# Patient Record
Sex: MALE | Race: WHITE | NOT HISPANIC OR LATINO | URBAN - METROPOLITAN AREA
[De-identification: names, ages, dates, MRNs, and addresses within clinical notes are randomized per-mention and may not be internally consistent; named-entity substitution may affect disease eponyms.]

---
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
## 2020-11-17 ENCOUNTER — INPATIENT (INPATIENT)
Facility: HOSPITAL | Age: 20
LOS: 2 days | Discharge: HOME | End: 2020-11-20
Attending: PSYCHIATRY & NEUROLOGY | Admitting: PSYCHIATRY & NEUROLOGY
Payer: COMMERCIAL

## 2020-11-17 VITALS
OXYGEN SATURATION: 99 % | SYSTOLIC BLOOD PRESSURE: 147 MMHG | HEIGHT: 71 IN | RESPIRATION RATE: 18 BRPM | HEART RATE: 99 BPM | TEMPERATURE: 98 F | DIASTOLIC BLOOD PRESSURE: 89 MMHG

## 2020-11-17 DIAGNOSIS — F33.2 MAJOR DEPRESSIVE DISORDER, RECURRENT SEVERE WITHOUT PSYCHOTIC FEATURES: ICD-10-CM

## 2020-11-17 DIAGNOSIS — F17.201 NICOTINE DEPENDENCE, UNSPECIFIED, IN REMISSION: ICD-10-CM

## 2020-11-17 LAB
ALBUMIN SERPL ELPH-MCNC: 4.8 G/DL — SIGNIFICANT CHANGE UP (ref 3.3–5)
ALP SERPL-CCNC: 85 U/L — SIGNIFICANT CHANGE UP (ref 60–270)
ALT FLD-CCNC: 50 U/L — HIGH (ref 4–41)
AMPHET UR-MCNC: NEGATIVE — SIGNIFICANT CHANGE UP
ANION GAP SERPL CALC-SCNC: 12 MMO/L — SIGNIFICANT CHANGE UP (ref 7–14)
APAP SERPL-MCNC: < 15 UG/ML — LOW (ref 15–25)
APPEARANCE UR: CLEAR — SIGNIFICANT CHANGE UP
AST SERPL-CCNC: 25 U/L — SIGNIFICANT CHANGE UP (ref 4–40)
B PERT DNA SPEC QL NAA+PROBE: SIGNIFICANT CHANGE UP
BARBITURATES UR SCN-MCNC: NEGATIVE — SIGNIFICANT CHANGE UP
BASOPHILS # BLD AUTO: 0.04 K/UL — SIGNIFICANT CHANGE UP (ref 0–0.2)
BASOPHILS NFR BLD AUTO: 0.5 % — SIGNIFICANT CHANGE UP (ref 0–2)
BENZODIAZ UR-MCNC: NEGATIVE — SIGNIFICANT CHANGE UP
BILIRUB SERPL-MCNC: 0.6 MG/DL — SIGNIFICANT CHANGE UP (ref 0.2–1.2)
BILIRUB UR-MCNC: NEGATIVE — SIGNIFICANT CHANGE UP
BLOOD UR QL VISUAL: NEGATIVE — SIGNIFICANT CHANGE UP
BUN SERPL-MCNC: 12 MG/DL — SIGNIFICANT CHANGE UP (ref 7–23)
C PNEUM DNA SPEC QL NAA+PROBE: SIGNIFICANT CHANGE UP
CALCIUM SERPL-MCNC: 10 MG/DL — SIGNIFICANT CHANGE UP (ref 8.4–10.5)
CANNABINOIDS UR-MCNC: NEGATIVE — SIGNIFICANT CHANGE UP
CHLORIDE SERPL-SCNC: 101 MMOL/L — SIGNIFICANT CHANGE UP (ref 98–107)
CO2 SERPL-SCNC: 25 MMOL/L — SIGNIFICANT CHANGE UP (ref 22–31)
COCAINE METAB.OTHER UR-MCNC: NEGATIVE — SIGNIFICANT CHANGE UP
COLOR SPEC: SIGNIFICANT CHANGE UP
CREAT SERPL-MCNC: 0.81 MG/DL — SIGNIFICANT CHANGE UP (ref 0.5–1.3)
EOSINOPHIL # BLD AUTO: 0.29 K/UL — SIGNIFICANT CHANGE UP (ref 0–0.5)
EOSINOPHIL NFR BLD AUTO: 3.9 % — SIGNIFICANT CHANGE UP (ref 0–6)
ETHANOL BLD-MCNC: < 10 MG/DL — SIGNIFICANT CHANGE UP
FLUAV H1 2009 PAND RNA SPEC QL NAA+PROBE: SIGNIFICANT CHANGE UP
FLUAV H1 RNA SPEC QL NAA+PROBE: SIGNIFICANT CHANGE UP
FLUAV H3 RNA SPEC QL NAA+PROBE: SIGNIFICANT CHANGE UP
FLUAV SUBTYP SPEC NAA+PROBE: SIGNIFICANT CHANGE UP
FLUBV RNA SPEC QL NAA+PROBE: SIGNIFICANT CHANGE UP
GLUCOSE SERPL-MCNC: 92 MG/DL — SIGNIFICANT CHANGE UP (ref 70–99)
GLUCOSE UR-MCNC: NEGATIVE — SIGNIFICANT CHANGE UP
HADV DNA SPEC QL NAA+PROBE: SIGNIFICANT CHANGE UP
HCOV PNL SPEC NAA+PROBE: SIGNIFICANT CHANGE UP
HCT VFR BLD CALC: 49.1 % — SIGNIFICANT CHANGE UP (ref 39–50)
HGB BLD-MCNC: 17.3 G/DL — HIGH (ref 13–17)
HMPV RNA SPEC QL NAA+PROBE: SIGNIFICANT CHANGE UP
HPIV1 RNA SPEC QL NAA+PROBE: SIGNIFICANT CHANGE UP
HPIV2 RNA SPEC QL NAA+PROBE: SIGNIFICANT CHANGE UP
HPIV3 RNA SPEC QL NAA+PROBE: SIGNIFICANT CHANGE UP
HPIV4 RNA SPEC QL NAA+PROBE: SIGNIFICANT CHANGE UP
IMM GRANULOCYTES NFR BLD AUTO: 0.3 % — SIGNIFICANT CHANGE UP (ref 0–1.5)
KETONES UR-MCNC: NEGATIVE — SIGNIFICANT CHANGE UP
LEUKOCYTE ESTERASE UR-ACNC: NEGATIVE — SIGNIFICANT CHANGE UP
LYMPHOCYTES # BLD AUTO: 1.74 K/UL — SIGNIFICANT CHANGE UP (ref 1–3.3)
LYMPHOCYTES # BLD AUTO: 23.3 % — SIGNIFICANT CHANGE UP (ref 13–44)
MCHC RBC-ENTMCNC: 29.1 PG — SIGNIFICANT CHANGE UP (ref 27–34)
MCHC RBC-ENTMCNC: 35.2 % — SIGNIFICANT CHANGE UP (ref 32–36)
MCV RBC AUTO: 82.7 FL — SIGNIFICANT CHANGE UP (ref 80–100)
METHADONE UR-MCNC: NEGATIVE — SIGNIFICANT CHANGE UP
MONOCYTES # BLD AUTO: 0.67 K/UL — SIGNIFICANT CHANGE UP (ref 0–0.9)
MONOCYTES NFR BLD AUTO: 9 % — SIGNIFICANT CHANGE UP (ref 2–14)
NEUTROPHILS # BLD AUTO: 4.71 K/UL — SIGNIFICANT CHANGE UP (ref 1.8–7.4)
NEUTROPHILS NFR BLD AUTO: 63 % — SIGNIFICANT CHANGE UP (ref 43–77)
NITRITE UR-MCNC: NEGATIVE — SIGNIFICANT CHANGE UP
NRBC # FLD: 0 K/UL — SIGNIFICANT CHANGE UP (ref 0–0)
OPIATES UR-MCNC: NEGATIVE — SIGNIFICANT CHANGE UP
OXYCODONE UR-MCNC: NEGATIVE — SIGNIFICANT CHANGE UP
PCP UR-MCNC: NEGATIVE — SIGNIFICANT CHANGE UP
PH UR: 7 — SIGNIFICANT CHANGE UP (ref 5–8)
PLATELET # BLD AUTO: 284 K/UL — SIGNIFICANT CHANGE UP (ref 150–400)
PMV BLD: 9.6 FL — SIGNIFICANT CHANGE UP (ref 7–13)
POTASSIUM SERPL-MCNC: 3.7 MMOL/L — SIGNIFICANT CHANGE UP (ref 3.5–5.3)
POTASSIUM SERPL-SCNC: 3.7 MMOL/L — SIGNIFICANT CHANGE UP (ref 3.5–5.3)
PROT SERPL-MCNC: 7.7 G/DL — SIGNIFICANT CHANGE UP (ref 6–8.3)
PROT UR-MCNC: NEGATIVE — SIGNIFICANT CHANGE UP
RAPID RVP RESULT: SIGNIFICANT CHANGE UP
RBC # BLD: 5.94 M/UL — HIGH (ref 4.2–5.8)
RBC # FLD: 12.2 % — SIGNIFICANT CHANGE UP (ref 10.3–14.5)
RSV RNA SPEC QL NAA+PROBE: SIGNIFICANT CHANGE UP
RV+EV RNA SPEC QL NAA+PROBE: SIGNIFICANT CHANGE UP
SALICYLATES SERPL-MCNC: < 5 MG/DL — LOW (ref 15–30)
SARS-COV-2 RNA SPEC QL NAA+PROBE: SIGNIFICANT CHANGE UP
SODIUM SERPL-SCNC: 138 MMOL/L — SIGNIFICANT CHANGE UP (ref 135–145)
SP GR SPEC: 1.01 — SIGNIFICANT CHANGE UP (ref 1–1.04)
TSH SERPL-MCNC: 0.99 UIU/ML — SIGNIFICANT CHANGE UP (ref 0.5–4.3)
UROBILINOGEN FLD QL: NORMAL — SIGNIFICANT CHANGE UP
WBC # BLD: 7.47 K/UL — SIGNIFICANT CHANGE UP (ref 3.8–10.5)
WBC # FLD AUTO: 7.47 K/UL — SIGNIFICANT CHANGE UP (ref 3.8–10.5)

## 2020-11-17 PROCEDURE — 99285 EMERGENCY DEPT VISIT HI MDM: CPT

## 2020-11-17 RX ORDER — HYDROXYZINE HCL 10 MG
50 TABLET ORAL EVERY 6 HOURS
Refills: 0 | Status: DISCONTINUED | OUTPATIENT
Start: 2020-11-17 | End: 2020-11-20

## 2020-11-17 RX ORDER — HALOPERIDOL DECANOATE 100 MG/ML
5 INJECTION INTRAMUSCULAR ONCE
Refills: 0 | Status: DISCONTINUED | OUTPATIENT
Start: 2020-11-17 | End: 2020-11-20

## 2020-11-17 RX ORDER — HALOPERIDOL DECANOATE 100 MG/ML
5 INJECTION INTRAMUSCULAR ONCE
Refills: 0 | Status: COMPLETED | OUTPATIENT
Start: 2020-11-17 | End: 2020-11-17

## 2020-11-17 RX ORDER — DIPHENHYDRAMINE HCL 50 MG
50 CAPSULE ORAL ONCE
Refills: 0 | Status: COMPLETED | OUTPATIENT
Start: 2020-11-17 | End: 2020-11-17

## 2020-11-17 RX ORDER — NICOTINE POLACRILEX 2 MG
2 GUM BUCCAL
Refills: 0 | Status: DISCONTINUED | OUTPATIENT
Start: 2020-11-17 | End: 2020-11-18

## 2020-11-17 RX ORDER — DIPHENHYDRAMINE HCL 50 MG
50 CAPSULE ORAL ONCE
Refills: 0 | Status: DISCONTINUED | OUTPATIENT
Start: 2020-11-17 | End: 2020-11-20

## 2020-11-17 RX ORDER — NICOTINE POLACRILEX 2 MG
4 GUM BUCCAL ONCE
Refills: 0 | Status: COMPLETED | OUTPATIENT
Start: 2020-11-17 | End: 2020-11-17

## 2020-11-17 RX ADMIN — Medication 2 MILLIGRAM(S): at 22:48

## 2020-11-17 RX ADMIN — Medication 4 MILLIGRAM(S): at 18:40

## 2020-11-17 RX ADMIN — HALOPERIDOL DECANOATE 5 MILLIGRAM(S): 100 INJECTION INTRAMUSCULAR at 22:48

## 2020-11-17 RX ADMIN — Medication 2 MILLIGRAM(S): at 15:42

## 2020-11-17 RX ADMIN — Medication 50 MILLIGRAM(S): at 22:48

## 2020-11-17 RX ADMIN — Medication 50 MILLIGRAM(S): at 22:00

## 2020-11-17 RX ADMIN — Medication 2 MILLIGRAM(S): at 22:00

## 2020-11-17 NOTE — ED BEHAVIORAL HEALTH ASSESSMENT NOTE - DESCRIPTION
Pt anxious appearing, displayed some irritability, offered ativan po 2 mg and accepted. He was not aggressive or violent, did not require restraints.  Vital Signs Last 24 Hrs  T(C): 36.7 (17 Nov 2020 13:57), Max: 36.7 (17 Nov 2020 13:57)  T(F): 98 (17 Nov 2020 13:57), Max: 98 (17 Nov 2020 13:57)  HR: 99 (17 Nov 2020 13:57) (99 - 99)  BP: 147/89 (17 Nov 2020 13:57) (147/89 - 147/89)  BP(mean): --  RR: 18 (17 Nov 2020 13:57) (18 - 18)  SpO2: 99% (17 Nov 2020 13:57) (99% - 99%) none sees hpi

## 2020-11-17 NOTE — ED BEHAVIORAL HEALTH ASSESSMENT NOTE - RISK ASSESSMENT
Pt is at high acute risk for suicide given his recent SA, insomnia, hopelessness and  current SI with plan to shoot himself. He possess protective factors of supportive family, no access to firearms and residential stability. However, protective factors is not enough to mitigate risk factors and he is an imminent danger to self and necessitates inpatient hospitalization. High Acute Suicide Risk

## 2020-11-17 NOTE — ED BEHAVIORAL HEALTH ASSESSMENT NOTE - SUBSTANCE ISSUES AND PLAN (INCLUDE STANDING AND PRN MEDICATION)
Nicorette gum 2 mg prn q 4 hrs, pt denies withdrawal symptoms Nicorette gum 2 mg prn q 2 hrs, pt denies withdrawal symptoms

## 2020-11-17 NOTE — ED ADULT NURSE NOTE - NSFALLRSKASSESSTYPE_ED_ALL_ED
Met with patient re:  No PCP. Patient would like provider at [facility].  Call to office and the only providers accepting new patients are booking weeks out.  Call transferred to [facility] and appointment schedule to establish care with Dr. Escobar 6/12/18 @ 9 am.  Information provided to patient and Jessica LOVE.   Initial (On Arrival)

## 2020-11-17 NOTE — ED ADULT NURSE REASSESSMENT NOTE - NS ED NURSE REASSESS COMMENT FT1
Pt is calmer after receiving Ativan 2mg.  Beginning to go through nicotine withdrawal as pt reports needing a cigarette.  VS WNL.  Admission to  pending COVID results.

## 2020-11-17 NOTE — ED BEHAVIORAL HEALTH ASSESSMENT NOTE - ACTIVATING EVENTS/STRESSORS
Triggering events leading to humiliation, shame, and/or despair (e.g. Loss of relationship, financial or health status) (real or anticipated)/Current or pending social isolation/Other

## 2020-11-17 NOTE — ED ADULT NURSE NOTE - OBJECTIVE STATEMENT
Pt is Alert & oriented x3.  Pt denies knowing why he was brought into the hospital.  Denies SI/HI, AH/VH.  Pt is visibly tense and laughs at questions asked.   Pt endorsed drinking a beer two nights ago but was unresponsive about any substance use.

## 2020-11-17 NOTE — ED PROVIDER NOTE - CLINICAL SUMMARY MEDICAL DECISION MAKING FREE TEXT BOX
This is  a19 yr old M, pmh depression with c/o SI and sever depression . As per ems pt expressed SI to his father and he activated 911. Upon arrival pt is guarded, oppositional, minimizing does not know why he is here. He does not think he needs help. He reports takes paxil but does not know who prescribing it and why.  Collateral info obtained from father Fredi ( [phone]), who reports pt had an episode end of September and od on his paxil approximately 30 pills, he was hospitalized at [facility].  Father reports he sees Dr Jeremie Bowling. Pt is on paxil and clonopin prn. Father states after dc from hospital was ok but for the last couple of weeks, he is declining, socially withdrawn, with less energy and mood. Yesterday he called his father and expressed "I do not want to livee anymore, I can not do this anymore  Labs, psych consult. This is  a19 yr old M, pmh depression with c/o SI and sever depression . As per ems pt expressed SI to his father and he activated 911. Upon arrival pt is guarded, oppositional, minimizing does not know why he is here. He does not think he needs help. He reports takes paxil but does not know who prescribing it and why.  Collateral info obtained from father Fredi ( [phone]), who reports pt had an episode end of September and od on his paxil approximately 30 pills, he was hospitalized at [facility].  Father reports he sees Dr Jeremie Bowling. Pt is on paxil and clonopin prn. Father states after dc from hospital was ok but for the last couple of weeks, he is declining, socially withdrawn, with less energy and mood. Yesterday he called his father and expressed "I do not want to live anymore, I can not do this anymore  Labs, psych consult. This is  a19 yr old M, pmh depression with c/o SI and sever depression . As per ems pt expressed SI to his father and he activated 911. Upon arrival pt is guarded, oppositional, minimizing does not know why he is here. He does not think he needs help. He reports takes paxil but does not know who prescribing it and why.  Collateral info obtained from father Fredi ( [phone]), who reports pt had an episode end of September and od on his paxil approximately 30 pills, he was hospitalized at [facility].  Father reports he sees Dr Jeremie Bowling. Pt is on paxil and clonopin prn. Father states after dc from hospital was ok but for the last couple of weeks, he is declining, socially withdrawn, with less energy and mood. Yesterday he called his father and expressed "I do not want to live anymore, I can not do this anymore  Labs- unremarkable, psych consult- inpatient tx

## 2020-11-17 NOTE — ED BEHAVIORAL HEALTH ASSESSMENT NOTE - HPI (INCLUDE ILLNESS QUALITY, SEVERITY, DURATION, TIMING, CONTEXT, MODIFYING FACTORS, ASSOCIATED SIGNS AND SYMPTOMS)
Pt is a 18 y/o single  male, unemployed, dropped out of high school 2 yrs ago, domiciled with parents and 8 y/o sister, with PPH of MDD, anxiety, no past inpatient hospitalization, reports of one past SA via od on half a bottle of paxil 25 mg (approx. 20 tabs), 1 month ago, was seen at [facility] ED/CPEP and discharged home, sees outpatient psychiatrist Dr. Christin Chao ([phone]), prescribed Paxil 30 mg and recently added Wellbutrin 150 mg, no PMH, denies past or current aggression/violence, reports recreational marijuana use, none recent, and social alcohol use, no report of complicated withdrawals, BIBA activated by father for depression and SI.     On assessment pt appears dysphoric, anxious but cooperative, guarded and evasive with some answers.  When asked why he think he was brought to the hospital, replies “I have no idea”.  He reports that last night he was having trouble sleeping, felt suicidal told his dad who gave him 0.5mg of Klonopin and was surprised to find EMS at his house this am to take him to the hospital. Pt reports that since stop going to school he has just been home without motivation to do anything.  He reports within the last month he has become increasingly depressed with insomnia (stays up all night and play video games), with feelings of helpless, worthlessness, anhedonia, difficulty concentrating, fatigue, and hopelessness about the future.  He reports he does not think he will get any better.  Goyo reports that he is still having these feelings and has been thinking “to shoot myself”.  He does not have access to firearms and does know anyone who does.  When ask if he had thoughts of OD on meds again, he replied no, “I want something that will work this time”.      Pt denies symptoms of hypo/epifanio.  He denies hearing voices or seeing things that others cannot hear or see. Pt denies previous trauma, denies physical or sexual abuse, and denies PTSD related symptoms. Pt reports that he has been taking his Paxil consistently for the past year, but did not take over the week end.  He denies any recent substance or alcohol use, (although contrary to farther who reports that pt has been drinking beer excessively since this weekend), reports smokes about a pack of cigarettes a day.   Pt initially did not think he needed to be hospitalized, became irritable wanted to leave, ativan 2 mg po was offered, pt accepted.  He then reconsidered and ask to sign himself self in to the hospital.     See  not for Collateral from father; Pt is a 18 y/o single  male, unemployed, dropped out of high school 2 yrs ago, domiciled with parents and 10 y/o sister, with PPH of MDD, anxiety, no past inpatient hospitalization, reports of one past SA via od on half a bottle of paxil 25 mg (approx. 20 tabs), 1 month ago, was seen at [facility] ED/CPEP and discharged home, sees outpatient psychiatrist Dr. Christin Chao ([phone]), prescribed Paxil 30 mg and recently added Wellbutrin 150 mg, no PMH, denies past or current aggression/violence, reports recreational marijuana use, none recent, and social alcohol use, no report of complicated withdrawals, BIBA activated by father for depression and SI.     On assessment pt appears dysphoric, anxious but cooperative, guarded and evasive with some answers.  When asked why he think he was brought to the hospital, replies “I have no idea”.  He reports that last night he was having trouble sleeping, felt suicidal told his dad who gave him 0.5mg of Klonopin and was surprised to find EMS at his house this am to take him to the hospital. Pt reports that since stop going to school he has just been home without motivation to do anything.  He reports within the last month he has become increasingly depressed with insomnia (stays up all night and play video games), with feelings of helpless, worthlessness, anhedonia, difficulty concentrating, fatigue, and hopelessness about the future.  He reports he does not think he will get any better.  Goyo reports that he is still having these feelings and has been thinking “to shoot myself”.  He does not have access to firearms and does know anyone who does.  When ask if he had thoughts of OD on meds again, he replied no, “I want something that will work this time”.      Pt denies symptoms of hypo/epifanio.  He denies hearing voices or seeing things that others cannot hear or see. Pt denies previous trauma, denies physical or sexual abuse, and denies PTSD related symptoms. Pt denies HI, denies violent thoughts.  Pt reports that he has been taking his Paxil consistently for the past year, but did not take over the week end.  He denies any recent substance or alcohol use, (although contrary to farther who reports that pt has been drinking beer excessively since this weekend), reports smokes about a pack of cigarettes a day.   Pt initially did not think he needed to be hospitalized, became irritable wanted to leave, ativan 2 mg po was offered, pt accepted.  He then reconsidered and ask to sign himself self in to the hospital.     See  not for Collateral from father; Pt is a 20 y/o single  male, unemployed, dropped out of high school 2 yrs ago, domiciled with parents and 10 y/o sister, with PPH of MDD, anxiety, no past inpatient hospitalization, reports of one past SA via od on half a bottle of paxil 25 mg (approx. 20 tabs), 1 month ago, was admitted medically at [facility] and discharged home, sees outpatient psychiatrist Dr. Christin Chao ([phone]), prescribed Paxil 30 mg and recently added Wellbutrin 150 mg, no PMH, denies past or current aggression/violence, reports recreational marijuana use, none recent, and social alcohol use, no report of complicated withdrawals, BIBA activated by father for depression and SI.     On assessment pt appears dysphoric, anxious but cooperative, guarded and evasive with some answers.  When asked why he think he was brought to the hospital, replies “I have no idea”.  He reports that last night he was having trouble sleeping, felt suicidal told his dad who gave him 0.5mg of Klonopin and was surprised to find EMS at his house this am to take him to the hospital. Pt reports that since stop going to school he has just been home without motivation to do anything.  He reports within the last month he has become increasingly depressed with insomnia (stays up all night and play video games), with feelings of helpless, worthlessness, anhedonia, difficulty concentrating, fatigue, and hopelessness about the future.  He reports he does not think he will get any better.  Goyo reports that he is still having these feelings and has been thinking “to shoot myself”.  He does not have access to firearms and does know anyone who does.  When ask if he had thoughts of OD on meds again, he replied no, “I want something that will work this time”.      Pt denies symptoms of hypo/epifanio.  He denies hearing voices or seeing things that others cannot hear or see. Pt denies previous trauma, denies physical or sexual abuse, and denies PTSD related symptoms. Pt denies HI, denies violent thoughts.  Pt reports that he has been taking his Paxil consistently for the past year, but did not take over the week end.  He denies any recent substance or alcohol use, (although contrary to farther who reports that pt has been drinking beer excessively since this weekend), reports smokes about a pack of cigarettes a day.   Pt initially did not think he needed to be hospitalized, became irritable wanted to leave, ativan 2 mg po was offered, pt accepted.  He then reconsidered and ask to sign himself self in to the hospital.     See  not for Collateral from father; Pt is a 18 y/o single  male, unemployed, dropped out of high school 2 yrs ago, domiciled with parents and 10 y/o sister, with PPH of MDD, anxiety, no past inpatient hospitalization, reports of one past SA via od on half a bottle of paxil 25 mg (approx. 20 tabs), 1 month ago, was admitted medically at [facility] and discharged home, sees outpatient psychiatrist Dr. Christin Chao ([phone]), prescribed Paxil 30 mg and recently added Wellbutrin 150 mg, no PMH, denies past or current aggression/violence, reports recreational marijuana use, none recent, and social alcohol use, no report of complicated withdrawals, BIBA activated by father for depression and SI.     On assessment pt appears dysphoric, anxious but cooperative, guarded and evasive with some answers.  When asked why he think he was brought to the hospital, replies “I have no idea”.  He reports that last night he was having trouble sleeping, felt suicidal told his dad who gave him 0.5mg of Klonopin and was surprised to find EMS at his house this am to take him to the hospital. Pt reports that since stop going to school he has just been home without motivation to do anything.  He reports within the last month he has become increasingly depressed with insomnia (stays up all night and play video games), with feelings of helpless, worthlessness, anhedonia, difficulty concentrating, fatigue, and hopelessness about the future.  He reports he does not think he will get any better.  Goyo reports that he is still having these feelings and has been thinking “to shoot myself”.  He does not have access to firearms and does know anyone who does.  When ask if he had thoughts of OD on meds again, he replied no, “I want something that will work this time”.      Pt denies symptoms of hypo/epifanio.  He denies hearing voices or seeing things that others cannot hear or see. Pt denies previous trauma, denies physical or sexual abuse, and denies PTSD related symptoms. Pt denies HI, denies violent thoughts.  Pt reports that he has been taking his Paxil consistently for the past year, but did not take over the week end.  He denies any recent substance or alcohol use, (although contrary to farther who reports that pt has been drinking beer excessively since this weekend), reports smokes about a pack of cigarettes a day.   Pt initially did not think he needed to be hospitalized, became irritable wanted to leave, ativan 2 mg po was offered, pt accepted.  He then reconsidered and ask to sign himself self in to the hospital.     See  note for Collateral from father;

## 2020-11-17 NOTE — ED ADULT TRIAGE NOTE - CHIEF COMPLAINT QUOTE
Patient brought to ER by EMS from home after patient expressed to father that he wanted help for suicidal ideations.

## 2020-11-17 NOTE — ED ADULT NURSE REASSESSMENT NOTE - NS ED NURSE REASSESS COMMENT FT1
Pt left  ED with [facility] security and staff at this time to go to [address]. Alert, oriented, and ambulatory on leave.

## 2020-11-17 NOTE — ED BEHAVIORAL HEALTH ASSESSMENT NOTE - SUMMARY
Pt is a 18 y/o single  male, unemployed, dropped out of high school 2 yrs ago, domiciled with parents and 10 y/o sister, with PPH of MDD, anxiety, no past inpatient hospitalization, reports of one past SA via od on half a bottle of paxil 25 mg (approx. 20 tabs), 1 month ago, was seen at [facility] ED/CPEP and discharged home, sees outpatient psychiatrist Dr. Christin Chao ([phone]), prescribed Paxil 30 mg and recently added Wellbutrin 150 mg, no PMH, denies past or current aggression/violence, reports recreational marijuana use, none recent, and social alcohol use, no report of complicated withdrawals, BIBA activated by father for depression and SI.     Pt is presenting with decompensating depression given low of mood, feelings of helplessness, worthlessness, anhedonia, hopeless about the future, as well as si with plan to shoot himself with a gun. He is ambivalent about alexandra to safety, he would benefit from inpatient psych hospitalization for safety, symptomatic stabilization and optimization of medication.   -pt voluntarily signed in for hospitalization.  -will be admitted to [facility] when medically cleared.   -pt verbalize will seek out staff if his suicidal thought becomes intense, no evidence of self-harm in Ed, therefor 1:1 recommended at this time. Pt is a 20 y/o single  male, unemployed, dropped out of high school 2 yrs ago, domiciled with parents and 10 y/o sister, with PPH of MDD, anxiety, no past inpatient hospitalization, reports of one past SA via od on half a bottle of paxil 25 mg (approx. 20 tabs), 1 month ago, was admitted medically at [facility] and discharged home, sees outpatient psychiatrist Dr. Christin Chao ([phone]), prescribed Paxil 30 mg and recently added Wellbutrin 150 mg, no PMH, denies past or current aggression/violence, reports recreational marijuana use, none recent, and social alcohol use, no report of complicated withdrawals, BIBA activated by father for depression and SI.     Pt is presenting with decompensating depression given low of mood, feelings of helplessness, worthlessness, anhedonia, hopeless about the future, as well as si with plan to shoot himself with a gun. He is ambivalent about alexandra to safety, he would benefit from inpatient psych hospitalization for safety, symptomatic stabilization and optimization of medication.   -pt voluntarily signed in for hospitalization.  -will be admitted to [facility] when medically cleared.   -pt verbalize will seek out staff if his suicidal thought becomes intense, no evidence of self-harm in Ed, therefor 1:1 recommended at this time. Pt is a 18 y/o single  male, unemployed, dropped out of high school 2 yrs ago, domiciled with parents and 10 y/o sister, with PPH of MDD, anxiety, no past inpatient hospitalization, reports of one past SA via od on half a bottle of paxil 25 mg (approx. 20 tabs), 1 month ago, was admitted medically at [facility] and discharged home, sees outpatient psychiatrist Dr. Christin Chao ([phone]), prescribed Paxil 30 mg and recently added Wellbutrin 150 mg, no PMH, denies past or current aggression/violence, reports recreational marijuana use, none recent, and social alcohol use, no report of complicated withdrawals, BIBA activated by father for depression and SI.   Pt is presenting with decompensating depression given low mood, feelings of helplessness, worthlessness, anhedonia, hopeless about the future, as well as si with plan to shoot himself with a gun. Pt presents an acute danger to self and requires inpatient psychiatric admission for safety and stabilization.

## 2020-11-17 NOTE — ED BEHAVIORAL HEALTH ASSESSMENT NOTE - PSYCHIATRIC ISSUES AND PLAN (INCLUDE STANDING AND PRN MEDICATION)
Continue Paxil 30 mg po q/d. Hydroxyzine 50 mg PO q6hr prn anxiety, Ativan 2 mg PO/IM q6hr prn for acute agitation

## 2020-11-17 NOTE — ED PROVIDER NOTE - OBJECTIVE STATEMENT
This is  a19 yr old M, [facility] depression This is  a19 yr old M, pmh depression with c/o SI and sever depression . As per ems pt expressed SI to his father and he activated 911. Upon arrival pt is guarded, oppositional, minimizing does not know why he is here. He does not think he needs help. He reports takes paxil but does not know who prescribing it and why.

## 2020-11-17 NOTE — ED BEHAVIORAL HEALTH ASSESSMENT NOTE - ADDITIONAL DETAILS ALL
1 month ago OD on Paxil, thinks this was not effective, thinking about shooting himself. No access to firearms.

## 2020-11-17 NOTE — ED BEHAVIORAL HEALTH ASSESSMENT NOTE - OTHER
father recently found out that a friend wanted to date his ex girl friend initially cooperative, then displayed some irritability guarded and evasive

## 2020-11-17 NOTE — ED BEHAVIORAL HEALTH NOTE - BEHAVIORAL HEALTH NOTE
Worker met with patient who provided father’s number Marques Nieto ([phone]) for collateral information. All information is as per Mr. Nieto:    Patient is a 19 year old male, domiciled with parents, with a diagnosis of MDD and anxiety, with a last hospitalization at [facility] in September for an overdose of Paxil, unemployed, not in school, BIBEMS activated by father for suicidal thoughts. Patient has been increasingly worsened for the last two weeks with his depression and anxiety. Patient was drinking about three beers yesterday and called his father and told father that he does not want to be here anymore and he is going to do something about it but did not elaborate. Father states after this phone call he tried to help facilitate with the right resources to help the patient. Father states that he was observing the patient all night and gave him .5mg of Clonazepam to help him sleep advised by Dr. Chao.  Prior to this patient was having issues with sleeping. Father states that most of the week the patient has been up playing video games and isolating in his room. Father states that the patient is not encouraged to participate in the zoom meetings with his therapist and does not want to follow up. Father states that the patient was drinking beer from a mini keg on Friday. He states that the patient sees psychiatrist Dr. Christin Chao ([phone]). Patient is prescribed Paxil 30mg and was started on Wellbutrin 150mg but only took one dose of this. Patient did not finish high school and is not motivated to leave his room. Patient showered today but has not showered prior to today, for a couple of days. Patient has been vaping nicotine. Patient has no medical issues, no allergies, and no SIB. Patient has no access to guns or weapons. Father denies that patient was having SI/HI. He states that the patient was not happy with him today when he activated ems. He states a couple weeks ago the patient’s friend called the patient and told him that he asked out his ex- girlfriend. Father believes this might have been a trigger. Father believes that the patient needs help because he has been worsening for the last two weeks. Worker met with patient who provided father’s number Marques Nieto ([phone]) for collateral information. All information is as per Mr. Nieto:    Patient is a 19 year old male, domiciled with parents, with a diagnosis of MDD and anxiety, with a last hospitalization at [facility] in September for an overdose of Paxil, unemployed, not in school, BIBEMS activated by father for suicidal thoughts. Patient has been increasingly worsened for the last two weeks with his depression and anxiety. Patient was drinking about three beers yesterday and called his father and told father that he does not want to be here anymore and he is going to do something about it but did not elaborate. Father states after this phone call he tried to help facilitate with the right resources to help the patient. Father states that he was observing the patient all night and gave him .5mg of Clonazepam to help him sleep advised by Dr. Chao.  Prior to this patient was having issues with sleeping. Father states that most of the week the patient has been up playing video games and isolating in his room. Father states that the patient is not encouraged to participate in the zoom meetings with his therapist and does not want to follow up. Father states that the patient was drinking beer from a mini keg on Friday. He states that the patient sees psychiatrist Dr. Christin Chao ([phone]). Patient is prescribed Paxil 30mg and was started on Wellbutrin 150mg but only took one dose of this. Patient did not finish high school and is not motivated to leave his room. Patient showered today but has not showered prior to today, for a couple of days. Patient has been vaping nicotine. Patient has no medical issues, no allergies, and no SIB. Patient has no access to guns or weapons. Father denies that patient was having SI/HI. He states that the patient was not happy with him today when he activated ems. He states a couple weeks ago the patient’s friend called the patient and told him that he asked out his ex- girlfriend. Father believes this might have been a trigger. Father believes that the patient needs help because he has been worsening for the last two weeks. Case discussed with NP. Patient will be admitted psychiatrically to [facility]. Worker called patient's father and informed of patient admission to 2n and provided unit contact numbers. Worker explained that currently there are no visitation hours.

## 2020-11-17 NOTE — ED BEHAVIORAL HEALTH ASSESSMENT NOTE - CASE SUMMARY
Pt is a 18 y/o single  male, unemployed, dropped out of high school 2 yrs ago, domiciled with parents and 8 y/o sister, with PPH of MDD, anxiety, no past inpatient hospitalization, reports of one past SA via od on half a bottle of paxil 25 mg (approx. 20 tabs), 1 month ago, was admitted medically at [facility] and discharged home, sees outpatient psychiatrist Dr. Christin Chao ([phone]), prescribed Paxil 30 mg and recently added Wellbutrin 150 mg, no PMH, denies past or current aggression/violence, reports recreational marijuana use, none recent, and social alcohol use, no report of complicated withdrawals, BIBA activated by father for depression and SI.   Pt is presenting with decompensating depression given low mood, feelings of helplessness, worthlessness, anhedonia, hopeless about the future, as well as si with plan to shoot himself with a gun. Pt presents an acute danger to self and requires inpatient psychiatric admission for safety and stabilization.

## 2020-11-17 NOTE — ED ADULT NURSE NOTE - NSIMPLEMENTINTERV_GEN_ALL_ED
Implemented All Universal Safety Interventions:  [place] to call system. Call bell, personal items and telephone within reach. Instruct patient to call for assistance. Room bathroom lighting operational. Non-slip footwear when patient is off stretcher. Physically safe environment: no spills, clutter or unnecessary equipment. Stretcher in lowest position, wheels locked, appropriate side rails in place.

## 2020-11-17 NOTE — ED BEHAVIORAL HEALTH ASSESSMENT NOTE - DESCRIPTION (FIRST USE, LAST USE, QUANTITY, FREQUENCY, DURATION)
reports smoke about a pack/day, also possible vaping as per dad. pt reports socially, dad reports increase consumption over the past week. recreational use, last use about a year ago as per pt.

## 2020-11-17 NOTE — ED BEHAVIORAL HEALTH NOTE - BEHAVIORAL HEALTH NOTE
1.        *Have you had a COVID-19 test in the last 21 days?  (  ) Yes   ( X ) No   (  ) Unknown- Reason: ______    IF YES PROCEED TO QUESTION #2. IF NO OR UNKNOWN THEN PLEASE SKIP TO QUESTION #3.    2.        Date of test: ________    3.        3. Do you know the result? (  ) Negative   (  ) Positive   (  X) No result available    4.        *In the past 14 days, have you been around anyone with a positive COVID-19 test?*    (  ) Yes   ( X ) No   (  ) Unknown- Reason (e.g. patient uncertain, sedated, refusing to answer, etc.):  ______    IF YES PROCEED TO QUESTION #5. IF NO or UNKNOWN, PLEASE SKIP TO QUESTION #10    5.        Were you within 6 feet of them for at least 15 minutes? (  ) Yes   (  ) No   (  ) Unknown- Reason: _____    6.        Have you provided care for them? (  ) Yes   (  ) No   (  ) Unknown- Reason: ______    7.        Have you had direct physical contact with them (touched, hugged, or kissed them)? (  ) Yes   (  ) No    (  ) Unknown- Reason: ___    8.        Have you shared eating or drinking utensils with them? (  ) Yes   (  ) No    (  ) Unknown- Reason: ____    9.        Have they sneezed, coughed, or somehow got respiratory droplets on you? (  ) Yes   (  ) No    (  ) Unknown- Reason: ______    10.     *Have you been out of [place] within the past 14 days?*    (  ) Yes   ( X ) No   (  ) Unknown- Reason (e.g. patient uncertain, sedated, refusing to answer, etc.): _______    IF YES PLEASE ANSWER THE FOLLOWING QUESTIONS:    11.     Which state/country have you been to? ______    12.     Were you there over 24 hours? (  ) Yes   (  ) No    (  ) Unknown- Reason: ______    13.     Date of return to [facility]: ______

## 2020-11-17 NOTE — ED BEHAVIORAL HEALTH ASSESSMENT NOTE - DETAILS
farther made aware of admission Reported od on Paxil 1 month ago, still having suicidal thoughts to shoot himself with a gun. father made aware of admission To [facility]

## 2020-11-18 LAB
SARS-COV-2 IGG SERPL QL IA: NEGATIVE — SIGNIFICANT CHANGE UP
SARS-COV-2 IGM SERPL IA-ACNC: <0.1 INDEX — SIGNIFICANT CHANGE UP

## 2020-11-18 PROCEDURE — 99222 1ST HOSP IP/OBS MODERATE 55: CPT | Mod: GC

## 2020-11-18 RX ORDER — NICOTINE POLACRILEX 2 MG
4 GUM BUCCAL
Refills: 0 | Status: DISCONTINUED | OUTPATIENT
Start: 2020-11-18 | End: 2020-11-20

## 2020-11-18 RX ORDER — NICOTINE POLACRILEX 2 MG
1 GUM BUCCAL DAILY
Refills: 0 | Status: DISCONTINUED | OUTPATIENT
Start: 2020-11-18 | End: 2020-11-20

## 2020-11-18 RX ORDER — MIRTAZAPINE 45 MG/1
15 TABLET, ORALLY DISINTEGRATING ORAL AT BEDTIME
Refills: 0 | Status: DISCONTINUED | OUTPATIENT
Start: 2020-11-18 | End: 2020-11-20

## 2020-11-18 RX ADMIN — Medication 4 MILLIGRAM(S): at 16:00

## 2020-11-18 RX ADMIN — Medication 30 MILLIGRAM(S): at 09:13

## 2020-11-18 RX ADMIN — Medication 2 MILLIGRAM(S): at 20:30

## 2020-11-18 RX ADMIN — Medication 1 PATCH: at 09:31

## 2020-11-18 RX ADMIN — Medication 4 MILLIGRAM(S): at 13:14

## 2020-11-18 RX ADMIN — MIRTAZAPINE 15 MILLIGRAM(S): 45 TABLET, ORALLY DISINTEGRATING ORAL at 21:31

## 2020-11-18 RX ADMIN — Medication 4 MILLIGRAM(S): at 09:33

## 2020-11-18 RX ADMIN — Medication 2 MILLIGRAM(S): at 15:59

## 2020-11-19 PROCEDURE — 99231 SBSQ HOSP IP/OBS SF/LOW 25: CPT | Mod: GC

## 2020-11-19 PROCEDURE — 90832 PSYTX W PT 30 MINUTES: CPT

## 2020-11-19 RX ORDER — MIRTAZAPINE 45 MG/1
1 TABLET, ORALLY DISINTEGRATING ORAL
Qty: 14 | Refills: 0
Start: 2020-11-19 | End: 2020-12-02

## 2020-11-19 RX ADMIN — Medication 4 MILLIGRAM(S): at 17:28

## 2020-11-19 RX ADMIN — Medication 50 MILLIGRAM(S): at 21:45

## 2020-11-19 RX ADMIN — Medication 4 MILLIGRAM(S): at 10:06

## 2020-11-19 RX ADMIN — Medication 1 PATCH: at 10:00

## 2020-11-19 RX ADMIN — Medication 4 MILLIGRAM(S): at 12:12

## 2020-11-19 RX ADMIN — Medication 30 MILLIGRAM(S): at 09:24

## 2020-11-19 RX ADMIN — Medication 4 MILLIGRAM(S): at 15:46

## 2020-11-19 RX ADMIN — MIRTAZAPINE 15 MILLIGRAM(S): 45 TABLET, ORALLY DISINTEGRATING ORAL at 20:01

## 2020-11-20 VITALS — TEMPERATURE: 98 F

## 2020-11-20 PROCEDURE — 90832 PSYTX W PT 30 MINUTES: CPT

## 2020-11-20 PROCEDURE — 99239 HOSP IP/OBS DSCHRG MGMT >30: CPT | Mod: GC

## 2020-11-20 RX ADMIN — Medication 4 MILLIGRAM(S): at 07:30

## 2020-11-20 RX ADMIN — Medication 4 MILLIGRAM(S): at 11:05

## 2020-11-20 RX ADMIN — Medication 30 MILLIGRAM(S): at 08:21

## 2020-11-20 RX ADMIN — Medication 1 PATCH: at 08:21

## 2020-11-20 RX ADMIN — Medication 1 PATCH: at 03:03

## 2020-11-25 NOTE — ED ADULT TRIAGE NOTE - MEANS OF ARRIVAL
Speech pathology  Attempted to see patient for swallow evaluation with daughter, Daylin Beard present. Patient lethargic, only moaning briefly in response to upright positioning, sternal rub, cold cloth to head and swabs to lips and mouth. Patient not appropriate to attempt PO given he is unarousable. Patient has been NPO since admission on 11/17. Lengthy conversation with daughter bedside. Daughter concerned that SLP is only coming in once a day and we may not be present when he does wake up. Discussed how typically we are only coming in once a day; however, if patient not appropriate at that time, becomes appropriate later in the day, RN is welcome to contact SLP to see if possible to return (explained not always a possibility). Also discussed how at this time, we have no insight into how patient is actually swallowing given he has not been appropriate since admission. Discussed how RN is able to complete STAND if SLP is not present during period when patient fully alert and appropriate for PO intake. Informed MD of patient's lethargy and inability to accept PO.      Thanks,  Lott Sandifer M.S. CCC-SLP ambulatory

## 2024-04-02 NOTE — ED PROVIDER NOTE - NS ED MD DISPO ISOLATION TYPES
Pt arrives to preproceudre area in stable condition from home. Vital signs stable. Assessement completed per flowsheet. IV patent. Procedure explained to patient who states understanding. Questions answered. Consent signed.    None

## 2025-02-19 NOTE — ED ADULT NURSE NOTE - NS_SISCREENINGSR_GEN_ALL_ED
Chronic, controlled.  Latest blood pressure and vitals reviewed-   Temp:  [97.1 °F (36.2 °C)-98 °F (36.7 °C)]   Pulse:  [61-78]   Resp:  [17-20]   BP: (106-157)/()   SpO2:  [89 %-98 %] .   Home meds for hypertension were reviewed and noted below.   Hypertension Medications              carvediloL (COREG) 12.5 MG tablet Take 1 tablet (12.5 mg total) by mouth 2 (two) times daily.    ENTRESTO  mg per tablet TAKE 1 TABLET BY MOUTH TWICE DAILY    hydrALAZINE (APRESOLINE) 25 MG tablet Take 1 tablet (25 mg total) by mouth every 8 (eight) hours as needed (blood pressure greater than 160/90).    sacubitriL-valsartan (ENTRESTO) 49-51 mg per tablet Take 1 tablet by mouth.    torsemide (DEMADEX) 20 MG Tab Take 2 tablets by mouth every morning.    torsemide 40 mg Tab Take 2 tablets by mouth.            While in the hospital, will manage blood pressure as follows; Continue home antihypertensive regimen    Will utilize p.r.n. blood pressure medication only if patient's blood pressure greater than  160/90 and he develops symptoms such as worsening chest pain or shortness of breath.  States taking hydralazine bid instead of tid prn for elevated blood pressures   Negative